# Patient Record
Sex: MALE | Race: BLACK OR AFRICAN AMERICAN | NOT HISPANIC OR LATINO | Employment: UNEMPLOYED
[De-identification: names, ages, dates, MRNs, and addresses within clinical notes are randomized per-mention and may not be internally consistent; named-entity substitution may affect disease eponyms.]

---

## 2021-09-03 ENCOUNTER — TRANSCRIBE ORDERS (OUTPATIENT)
Dept: OTHER | Age: 61
End: 2021-09-03

## 2021-09-03 DIAGNOSIS — R39.11 URINARY HESITANCY: Primary | ICD-10-CM

## 2021-09-03 DIAGNOSIS — N40.0 ENLARGED PROSTATE: ICD-10-CM

## 2021-09-07 ENCOUNTER — TELEPHONE (OUTPATIENT)
Dept: UROLOGY | Facility: CLINIC | Age: 61
End: 2021-09-07

## 2021-09-07 NOTE — TELEPHONE ENCOUNTER
[facility] Call Center    Phone Message    May a detailed message be left on voicemail: yes     Reason for Call: Appointment Intake    Referring Provider Name: Referral from [facility], Mora Ba    Diagnosis and/or Symptoms: urinary hesitancy and incomplete emptying, enlarged prostate on exam    Action Taken: Message routed to:  [facility] ([facility] uro    Travel Screening: Not Applicable

## 2021-09-11 NOTE — TELEPHONE ENCOUNTER
Unable to leave a VM, number did not belong to patient. If patient calls back need updated numbers. Patient can get scheduled with Dr Recinos.

## 2023-06-02 ENCOUNTER — HEALTH MAINTENANCE LETTER (OUTPATIENT)
Age: 63
End: 2023-06-02

## 2023-06-22 ENCOUNTER — OFFICE VISIT (OUTPATIENT)
Dept: FAMILY MEDICINE | Facility: CLINIC | Age: 63
End: 2023-06-22
Payer: COMMERCIAL

## 2023-06-22 VITALS
TEMPERATURE: 98.3 F | SYSTOLIC BLOOD PRESSURE: 128 MMHG | OXYGEN SATURATION: 98 % | BODY MASS INDEX: 21.57 KG/M2 | HEART RATE: 105 BPM | RESPIRATION RATE: 20 BRPM | WEIGHT: 162.8 LBS | DIASTOLIC BLOOD PRESSURE: 75 MMHG | HEIGHT: 73 IN

## 2023-06-22 DIAGNOSIS — Z12.5 SCREENING FOR PROSTATE CANCER: ICD-10-CM

## 2023-06-22 DIAGNOSIS — N40.1 BPH ASSOCIATED WITH NOCTURIA: Primary | ICD-10-CM

## 2023-06-22 DIAGNOSIS — R35.1 BPH ASSOCIATED WITH NOCTURIA: Primary | ICD-10-CM

## 2023-06-22 DIAGNOSIS — A63.0 GENITAL WARTS: ICD-10-CM

## 2023-06-22 DIAGNOSIS — R97.20 ELEVATED PROSTATE SPECIFIC ANTIGEN (PSA): ICD-10-CM

## 2023-06-22 LAB — PSA SERPL DL<=0.01 NG/ML-MCNC: 251 NG/ML (ref 0–4.5)

## 2023-06-22 PROCEDURE — 36415 COLL VENOUS BLD VENIPUNCTURE: CPT | Performed by: INTERNAL MEDICINE

## 2023-06-22 PROCEDURE — G0103 PSA SCREENING: HCPCS | Performed by: INTERNAL MEDICINE

## 2023-06-22 PROCEDURE — 99204 OFFICE O/P NEW MOD 45 MIN: CPT | Performed by: INTERNAL MEDICINE

## 2023-06-22 RX ORDER — TAMSULOSIN HYDROCHLORIDE 0.4 MG/1
CAPSULE ORAL
COMMUNITY
Start: 2023-06-19

## 2023-06-22 RX ORDER — TADALAFIL 2.5 MG/1
2.5 TABLET ORAL AT BEDTIME
Qty: 30 TABLET | Refills: 11 | Status: SHIPPED | OUTPATIENT
Start: 2023-06-22

## 2023-06-22 RX ORDER — SILDENAFIL 50 MG/1
TABLET, FILM COATED ORAL
COMMUNITY
Start: 2023-06-19

## 2023-06-22 ASSESSMENT — PAIN SCALES - GENERAL: PAINLEVEL: NO PAIN (0)

## 2023-06-22 NOTE — PATIENT INSTRUCTIONS
At [facility], we strive to deliver an exceptional experience to you, every time we see you. If you receive a survey, please complete it as we do value your feedback.  If you have MyChart, you can expect to receive results automatically within 24 hours of their completion.  Your provider will send a note interpreting your results as well.   If you do not have MyChart, you should receive your results in about a week by mail.    Your care team:                            Family Medicine Internal Medicine   MD Zack Smith MD Shantel Branch-Fleming, MD Srinivasa Vaka, MD Katya Belousova, PAROYA Joyner CNP, MD ([facility]   Keenan Chambers, MD Kelley Henderson MD Amelia Massimini APRN ISRAEL Torres APRN MD Lenora Hong MD          Clinic hours: Monday - Thursday 7 am-6 pm; Fridays 7 am-5 pm.   Urgent care: Monday - Friday 10 am- 8 pm; Saturday and Sunday 9 am-5 pm.    Clinic: [phone]       [facility]: Monday - Thursday 8 am - 7 pm; Friday 8 am - 6 pm  [facility]: [phone]

## 2023-06-22 NOTE — PROGRESS NOTES
[facility] Internal Medicine Progress Note           Assessment and Plan:     BPH associated with nocturia  Continue Tamsulosin, but add daily dose of Tadalafil. Patient advised about potential adverse drug effects.  - tadalafil (CIALIS) 2.5 MG tablet; Take 1 tablet (2.5 mg) by mouth At Bedtime  - Creatinine; Future    Elevated prostate specific antigen (PSA)  PSA level is worse.   - Adult Urology  Referral; Future  - MR Prostate wo & w Contrast; Future  - PSA, tumor marker; Future    Screening for prostate cancer  - PSA, screen    Genital warts  - Adult Dermatology Referral; Future         Interval History:   Mp is a 62 year old, presenting for the following health issues:    Urinary () - Male  Duration of complaint: chronic   Description:   Dysuria (painful urination): no   Hematuria (blood in urine): no   Frequency: YES  Are you urinating at night : YES  Hesitancy (delay in urine): YES  Retention (unable to empty): no   Decrease in urinary flow: no   Incontinence: no   Progression of Symptoms:  improving  Accompanying Signs & Symptoms:  Fever: no   Back/Flank pain: no   History:   History of frequent UTI's: no   History of kidney stones: no   History of hernias: no  Personal or Family history of Prostate problems: Yes. His brother has prostate cancer. The patient is seeking a second opinion regarding his Tamsulosin and screening for prostate cancer.  Sexually active: YES  Precipitating factors:   Advancing age.  Other symptoms:  Urethral discharge: no   Testicle lumps/masses/pain: no   Nausea and/or vomiting: no   Abdominal pain: no   Therapies Tried and outcome: Tamsulosin is effective.    Skin Lesion  Onset/Duration: one year ago  Description  Location: towards the back of penis  Color: brown  Border description: irregular border  Character: blotchy, raised  Itching: no  Bleeding:  No  Intensity:  moderate  Progression of Symptoms:  worsening  Accompanying signs and symptoms:  "  Bleeding: No  Scaling: No  Excessive sun exposure/tanning: No  Sunscreen used: No  History:           Any previous history of skin cancer: No  Any family history of melanoma: No  Previous episodes of similar lesion: YES, at 21 years old it  Dried up and fell off  Precipitating or alleviating factors: none  Therapies tried and outcome: none              Significant Problems:   Patient Active Problem List   Diagnosis     Elevated prostate specific antigen (PSA)     BPH associated with nocturia              Review of Systems:   CONSTITUTIONAL: NEGATIVE for fever, chills, change in weight  INTEGUMENTARY/SKIN: NEGATIVE for worrisome rashes, moles or lesions  EYES: NEGATIVE for vision changes or irritation  ENT/MOUTH: NEGATIVE for ear, mouth and throat problems  RESP: NEGATIVE for significant cough or SOB  CV: NEGATIVE for chest pain, palpitations or peripheral edema  GI: NEGATIVE for nausea, abdominal pain, heartburn, or change in bowel habits   male :As above.  MUSCULOSKELETAL: NEGATIVE for significant arthralgias or myalgia  NEURO: NEGATIVE for weakness, dizziness or paresthesias  ENDOCRINE: NEGATIVE for temperature intolerance, skin/hair changes  HEME: NEGATIVE for bleeding problems  PSYCHIATRIC: NEGATIVE for changes in mood or affect             Physical Exam:   /75 (BP Location: Left arm, Patient Position: Sitting, Cuff Size: Adult Regular)   Pulse 105   Temp 98.3  F (36.8  C) (Oral)   Resp 20   Ht 1.854 m (6' 1\")   Wt 73.8 kg (162 lb 12.8 oz)   SpO2 98%   BMI 21.48 kg/m    Constitutional: Awake, alert, cooperative, no apparent distress, and appears stated age.  Eyes: extra-ocular muscles intact and sclera clear  ENT: normocepalic, without obvious abnormality  Lungs: no increased work of breathing and no retractions  Cardiovascular: regular rate and rhythm  Musculoskeletal: no lower extremity pitting edema present  Neurologic: Mental Status Exam:  Level of Alertness:   awake  Orientation:   person, " place, time  Memory:   normal  Fund of Knowledge:  normal  Attention/Concentration:  normal  Language:  normal  Motor Exam:  moves all extremities well and symmetrically  Neuropsychiatric: Normal affect, mood, orientation, memory and insight.          Data:     Component Ref Range & Units 10 d ago     Prostate Specific Antigen Screen 0.00 - 4.50 ng/mL 251.00 High     Resulting Agency  [facility]              Narrative  Performed by: GABRIEL  This result is obtained using the Roche Elecsys total PSA method on the shilo e801 immunoassay analyzer. Results obtained with different assay methods or kits cannot be used interchangeably.      Specimen Collected: 06/22/23 10:16 AM Last Resulted: 06/22/23  4:06 PM           Contains abnormal data PROSTATE SPECIFIC ANTIGEN W/RFLX PSA FREE  Order: [id number]   Ref Range & Units 1 yr ago   TOTAL PSA < OR = 4.0 ng/mL 89.2 High     Comment: The Total PSA value from this assay system is   standardized against the equimolar PSA standard.   The test result will be approximately 20% higher   when compared to the WHO-standardized Total PSA   (Siemens assay). Comparison of serial PSA results   should be interpreted with this fact in mind.     PSA was performed using the Jordi Craig   Immunoassay method. Values obtained from different   assay methods cannot be used interchangeably. PSA   levels, regardless of value, should not be   interpreted as absolute evidence of the presence or   absence of disease.   Resulting Agency  [facility]   Narrative  Performed by [employer]ANK[place]N  FASTING:NO    Specimen Collected: 08/26/21 11:24 AM           Disposition:  Follow-up in 4 weeks or as needed.    Zack Rand MD  Internal Medicine  [facility] Team

## 2023-06-29 PROBLEM — N40.1 BPH ASSOCIATED WITH NOCTURIA: Status: ACTIVE | Noted: 2023-06-29

## 2023-06-29 PROBLEM — R35.1 BPH ASSOCIATED WITH NOCTURIA: Status: ACTIVE | Noted: 2023-06-29

## 2023-06-29 PROBLEM — R97.20 ELEVATED PROSTATE SPECIFIC ANTIGEN (PSA): Status: ACTIVE | Noted: 2023-06-29

## 2023-07-02 ENCOUNTER — LAB (OUTPATIENT)
Dept: LAB | Facility: CLINIC | Age: 63
End: 2023-07-02
Payer: COMMERCIAL

## 2023-07-02 DIAGNOSIS — N40.1 BPH ASSOCIATED WITH NOCTURIA: ICD-10-CM

## 2023-07-02 DIAGNOSIS — R35.1 BPH ASSOCIATED WITH NOCTURIA: ICD-10-CM

## 2023-07-02 DIAGNOSIS — R97.20 ELEVATED PROSTATE SPECIFIC ANTIGEN (PSA): ICD-10-CM

## 2023-07-02 LAB
CREAT SERPL-MCNC: 0.87 MG/DL (ref 0.67–1.17)
GFR SERPL CREATININE-BSD FRML MDRD: >90 ML/MIN/1.73M2
PSA SERPL DL<=0.01 NG/ML-MCNC: 267 NG/ML (ref 0–4.5)

## 2023-07-02 PROCEDURE — 82565 ASSAY OF CREATININE: CPT

## 2023-07-02 PROCEDURE — 36415 COLL VENOUS BLD VENIPUNCTURE: CPT

## 2023-07-02 PROCEDURE — 84153 ASSAY OF PSA TOTAL: CPT

## 2023-07-20 ENCOUNTER — TELEPHONE (OUTPATIENT)
Dept: UROLOGY | Facility: CLINIC | Age: 63
End: 2023-07-20
Payer: COMMERCIAL

## 2023-07-20 NOTE — TELEPHONE ENCOUNTER
7/20 Called patient and left voicemail. Provided patient with [phone] to call clinic and reschedule appointment on 9/1/2023.      Spots on hold for 9/8/2023 clinic with Marleny Chaves PA-C. Offer that day for next available in-person.      If patients are willing to meet with Marleny Chaves PA-C virtually, can offer 8/25/2023 for a VIRTUAL only appointment.    Tabatha petit Procedure   Dermatology, Surgery, Urology  [facility]

## 2023-10-27 ENCOUNTER — TELEPHONE (OUTPATIENT)
Dept: FAMILY MEDICINE | Facility: CLINIC | Age: 63
End: 2023-10-27
Payer: COMMERCIAL

## 2023-10-27 NOTE — TELEPHONE ENCOUNTER
Patient Quality Outreach    Patient is due for the following:   Colon Cancer Screening  Physical Preventive Adult Physical      Topic Date Due    Pneumococcal Vaccine (1 - PCV) Never done    Diptheria Tetanus Pertussis (DTAP/TDAP/TD) Vaccine (1 - Tdap) Never done    Zoster (Shingles) Vaccine (1 of 2) Never done    Flu Vaccine (1) 09/01/2023    COVID-19 Vaccine (4 - 2023-24 season) 09/01/2023       Next Steps:   Schedule a Adult Preventative    Type of outreach:    Sent [employer] message.      Questions for provider review:    None           Ondina Avina MA

## 2024-01-25 ENCOUNTER — TELEPHONE (OUTPATIENT)
Dept: FAMILY MEDICINE | Facility: CLINIC | Age: 64
End: 2024-01-25
Payer: COMMERCIAL

## 2024-01-25 NOTE — TELEPHONE ENCOUNTER
Patient Quality Outreach    Patient is due for the following:   Colon Cancer Screening  Physical Preventive Adult Physical      Topic Date Due    Pneumococcal Vaccine (1 of 2 - PCV) Never done    Diptheria Tetanus Pertussis (DTAP/TDAP/TD) Vaccine (1 - Tdap) Never done    Zoster (Shingles) Vaccine (1 of 2) Never done    Flu Vaccine (1) 09/01/2023    COVID-19 Vaccine (4 - 2023-24 season) 09/01/2023       Next Steps:   Schedule a Adult Preventative    Type of outreach:    Sent letter.      Questions for provider review:    None           Ondina Avina MA

## 2024-06-29 ENCOUNTER — HEALTH MAINTENANCE LETTER (OUTPATIENT)
Age: 64
End: 2024-06-29

## 2025-07-13 ENCOUNTER — HEALTH MAINTENANCE LETTER (OUTPATIENT)
Age: 65
End: 2025-07-13